# Patient Record
Sex: FEMALE | Race: WHITE
[De-identification: names, ages, dates, MRNs, and addresses within clinical notes are randomized per-mention and may not be internally consistent; named-entity substitution may affect disease eponyms.]

---

## 2017-11-13 ENCOUNTER — HOSPITAL ENCOUNTER (OUTPATIENT)
Dept: HOSPITAL 80 - FIMAGING | Age: 77
End: 2017-11-13
Attending: PHYSICIAN ASSISTANT
Payer: COMMERCIAL

## 2017-11-13 DIAGNOSIS — Z12.31: Primary | ICD-10-CM

## 2017-11-13 DIAGNOSIS — Z90.11: ICD-10-CM

## 2017-11-13 DIAGNOSIS — Z85.3: ICD-10-CM

## 2017-12-18 ENCOUNTER — HOSPITAL ENCOUNTER (OUTPATIENT)
Dept: HOSPITAL 80 - BMCIMAGING | Age: 77
End: 2017-12-18
Attending: PHYSICIAN ASSISTANT
Payer: COMMERCIAL

## 2017-12-18 DIAGNOSIS — M85.89: ICD-10-CM

## 2017-12-18 DIAGNOSIS — Z13.820: Primary | ICD-10-CM

## 2018-11-14 ENCOUNTER — HOSPITAL ENCOUNTER (OUTPATIENT)
Dept: HOSPITAL 80 - FIMAGING | Age: 78
End: 2018-11-14
Attending: PHYSICIAN ASSISTANT
Payer: COMMERCIAL

## 2018-11-14 DIAGNOSIS — Z90.11: ICD-10-CM

## 2018-11-14 DIAGNOSIS — Z12.31: Primary | ICD-10-CM

## 2018-11-14 DIAGNOSIS — Z85.3: ICD-10-CM

## 2019-01-22 ENCOUNTER — HOSPITAL ENCOUNTER (EMERGENCY)
Dept: HOSPITAL 80 - FED | Age: 79
Discharge: HOME | End: 2019-01-22
Payer: COMMERCIAL

## 2019-01-22 VITALS — DIASTOLIC BLOOD PRESSURE: 78 MMHG | SYSTOLIC BLOOD PRESSURE: 128 MMHG
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
## 2019-01-22 DIAGNOSIS — S63.502A: Primary | ICD-10-CM

## 2019-01-22 NOTE — EDPHY
H & P


Time Seen by Provider: 19 08:03


HPI/ROS: 





HPI


Left wrist injury.





79-year-old female by private vehicle with her daughter.  This patient is right-

hand dominant.  This patient was hiking yesterday.  She stepped in a puddle 

that had some ice in it.  She slipped, fell backwards onto an outstretched left 

hand.  She complains of isolated left wrist pain.  She describes this as 

located dorsal aspect, radial aspect of the left wrist.  She denies any other 

injury or complaint.  She did not hit her head.  No loss of sensation or 

weakness in her extremities.  No other extremity pain.  She is not on 

anticoagulation or antiplatelet agents.





ROS:





Constitutional:  No fever, no chills.  No weakness.


Respiratory:  No cough.  No shortness of breath.


Cardiac:  No chest pain, no palpitations.


Gastrointestinal:  No abdominal pain, no vomiting, no diarrhea.


Musculoskeletal:  No back pain.  No neck pain.  As above.


Skin:  No lacerations or abrasions.


Neurological:  No headache.  No focal weakness or altered sensation.





Past medical history:  Definitive mastectomy.  Osteopenia.





Social history:  Nonsmoker.  Here with her daughter.  No alcohol.





Physical Exam:





General Appearance:  Alert, no distress.  This patient is responding to 

questions appropriately and in full sentences.  This patient appears well-

hydrated and well-nourished.


Head:  Normocephalic atraumatic.


Eyes:  Pupils equal and round no pallor or injection.  No lid edema, erythema 

or injection.


Left hand and wrist exam:  She does have some mild swelling over the dorsal 

radial aspect of the distal left wrist.  There is associated faint ecchymosis 

which migrates up the radial aspect of her forearm to the approximate mid 

forearm.  There is no significant warmth.  She does have some pain with axial 

compression of her left thumb as well as some snuffbox tenderness on palpation.

  The bony aspects of the hand are nontender on palpation.  She has normal 

capillary refill in all of her distal digits of the left hand.  The left hand 

is neurovascularly intact.  There are no obvious lacerations or abrasions.  The 

skin is intact.


Neurological:  Motor sensory function is grossly intact.  Cranial nerves are 

normal.  Gait is normal.


Skin:  Warm and dry, no rashes.


Musculoskeletal:  Neck is supple and nontender.


Extremities are symmetrical.  All joints range without pain or impingement.


Psychiatric:  No agitation.  No depression.





Database:





EKG:





Imaging:





Left wrist x-ray series:  Osteopenia.  Degenerative changes.  No fracture, 

subluxation, dislocation appreciated.  Interpreted by me.  I also reviewed this 

study with staff radiologist Dr. Man Medina who agrees with my 

interpretation.





Procedures:





Emergency department course:





Triage vital signs reviewed.  She is mildly hypertensive.  Vital signs are 

otherwise normal.





8:35 a.m., the patient was re-evaluated, resting comfortably at this time.  

Results of her x-rays were discussed with her and her daughter.  Plan will be 

to place the left wrist and hand in a Velcro thumb spica wrist splint.  I will 

then have her follow up with Orthopedics for re-evaluation and more advanced 

imaging if appropriate.  Both she and her daughter air in agreement with this 

plan.  I discussed ibuprofen dosing.  I discussed narcotic pain medication 

dosing.  I will give her a small prescription for Vicodin to be taken only as 

needed.  They are in agreement with this plan.  Return to emergency department 

precautions were reviewed.  All of their questions were answered.  The patient 

was discharged home in good condition with her daughter.





Differential Diagnosis:





The differential diagnosis on this patient includes but is not limited to 

fracture, sprain, dislocation of the left wrist.  This represents a partial 

list of diagnoses considered.  These considerations are based on history, 

physical exam, past history, reassessment and diagnostic testing.


Smoking Status: Never smoked


Constitutional: 


 Initial Vital Signs











Temperature (C)  36.7 C   19 07:47


 


Heart Rate  78   19 07:47


 


Respiratory Rate  16   19 07:47


 


Blood Pressure  153/90 H  19 07:47


 


O2 Sat (%)  98   19 07:47








 











O2 Delivery Mode               Room Air














Allergies/Adverse Reactions: 


 





No Allergies [NKDA] Allergy (Verified 12 11:06)


 








Home Medications: 














 Medication  Instructions  Recorded


 


Aspirin [Aspirin 81mg (OTC)] 81 mg PO DAILY 12


 


Calcium Citrate [Calcitrate] 500 mg PO BID 12


 


Cholecalciferol Vit D3 [Vitamin D3 800 units PO BID 12





400 units (OTC)]  


 


Herbal/Supplements  12


 


Multivitamins [Multivitamin (OTC)] 1 each PO DAILY 12


 


Hydrocodone/APAP 5/325 [Norco 1 - 2 tab PO Q4-6PRN PRN #7 tab 19





5/325 (*)]  














Medical Decision Making





- Diagnostics


Imaging Results: 


 Imaging Impressions





Wrist X-Ray  19 07:53


Impression:


 


1. Bone demineralization.


2. Multicentric degenerative osteoarthritic features, as-detailed.


3. Scapholunate diastasis.


4. Negative ulnar variance.


5. The patient reportedly is most tender over the distal dorsal radial aspect 

of the wrist, with no convincing fracture identified. Consider conservative 

management and short-term repeat radiographic follow-up in 7-14 days, unless 

otherwise clinically indicated.


 


Findings were discussed with Laughlin B. McCollester, MD at 8:35, on 2019.


 














- Data Points


Medications Given: 


 








Discontinued Medications





Ibuprofen (Motrin)  600 mg PO EDNOW ONE


   Stop: 19 08:19


   Last Admin: 19 08:39 Dose:  600 mg








Departure





- Departure


Disposition: Home, Routine, Self-Care


Clinical Impression: 


 Left wrist injury, Left wrist sprain





Condition: Good


Instructions:  R.I.C.E. Treatment (ED), Wrist Sprain (ED)


Additional Instructions: 


Read and follow provided instructions.





Follow-up with Orthopedics, Dr. Migue Gold or 1 of his partners, in 1-2 days 

for re-evaluation.  Call their office today for appointment time.  Explained 

this is for an emergency department follow-up for your left wrist injury.





Ibuprofen dosin mg every 6 hours with meals for the next 3 days only.  

Take only as needed for pain.





Narcotic pain medication dosin-2 every 4-6 hours only as needed for pain.





Return to the emergency department for worsening pain, swelling, discoloration, 

loss of sensation or weakness or other serious concerns.


Referrals: 


Migue Gold MD [Medical Doctor] - As per Instructions


Prescriptions: 


Hydrocodone/APAP 5/325 [Norco 5/325 (*)] 1 - 2 tab PO Q4-6PRN PRN #7 tab


 PRN Reason: Pain, Moderate